# Patient Record
Sex: MALE | URBAN - METROPOLITAN AREA
[De-identification: names, ages, dates, MRNs, and addresses within clinical notes are randomized per-mention and may not be internally consistent; named-entity substitution may affect disease eponyms.]

---

## 2019-12-05 ENCOUNTER — APPOINTMENT (RX ONLY)
Dept: URBAN - METROPOLITAN AREA CLINIC 94 | Facility: CLINIC | Age: 44
Setting detail: DERMATOLOGY
End: 2019-12-05

## 2019-12-05 DIAGNOSIS — L91.0 HYPERTROPHIC SCAR: ICD-10-CM

## 2019-12-05 DIAGNOSIS — L72.0 EPIDERMAL CYST: ICD-10-CM

## 2019-12-05 PROCEDURE — ? COUNSELING

## 2019-12-05 PROCEDURE — ? INCISION AND DRAINAGE

## 2019-12-05 PROCEDURE — 10060 I&D ABSCESS SIMPLE/SINGLE: CPT

## 2019-12-05 PROCEDURE — ? INTRALESIONAL KENALOG

## 2019-12-05 PROCEDURE — ? DEFER

## 2019-12-05 PROCEDURE — 11900 INJECT SKIN LESIONS </W 7: CPT

## 2019-12-05 PROCEDURE — 99202 OFFICE O/P NEW SF 15 MIN: CPT | Mod: 25

## 2019-12-05 PROCEDURE — ? ORDER TESTS

## 2019-12-05 PROCEDURE — ? PRESCRIPTION

## 2019-12-05 PROCEDURE — ? PHOTO-DOCUMENTATION

## 2019-12-05 PROCEDURE — ? OBSERVATION AND MEASURE

## 2019-12-05 ASSESSMENT — LOCATION DETAILED DESCRIPTION DERM
LOCATION DETAILED: RIGHT MEDIAL SUPERIOR CHEST
LOCATION DETAILED: LEFT INFERIOR PREAURICULAR CHEEK
LOCATION DETAILED: RIGHT MEDIAL SUPERIOR CHEST
LOCATION DETAILED: LEFT LATERAL MANDIBULAR CHEEK

## 2019-12-05 ASSESSMENT — LOCATION ZONE DERM
LOCATION ZONE: TRUNK
LOCATION ZONE: FACE
LOCATION ZONE: TRUNK

## 2019-12-05 ASSESSMENT — LOCATION SIMPLE DESCRIPTION DERM
LOCATION SIMPLE: LEFT CHEEK
LOCATION SIMPLE: CHEST
LOCATION SIMPLE: CHEST

## 2019-12-05 NOTE — PROCEDURE: INCISION AND DRAINAGE
Preparation Text: The area was prepped in the usual sterile fashion.
Lesion Type: Cyst
Epidermal Sutures: 4-0 Ethilon
Post-Care Instructions: I reviewed with the patient in detail post-care instructions. \\n\\nPatient should keep wound covered with our bandage for 24 hours. \\n\\nRemove bandage on the day after procedure and begin the following care TWICE DAILY for at least 2 weeks:\\n\\n1) Cleanse site with soap and water.\\n2) Apply bacitracin ointment or aquaphor healing ointment. \\n3) Keep covered during the day with a bandaid to prevent friction and to prevent ointment from staining clothing. Remove bandaid and allow wound to breathe at night.\\n\\n Call the office should any redness, pain, swelling or worsening occur.
Anesthesia Volume In Cc: 3
Render Postcare In Note?: Yes
Detail Level: Detailed
Dressing: dry sterile dressing
Size Of Lesion In Cm (Optional But May Be Required For Some Insurances): 3.7
Anesthesia Type: 1% lidocaine with epinephrine
Curette Text (Optional): After the contents were expressed a curette was used to partially remove the cyst wall.
Suture Text: The incision was partially closed with
Wound Care: Bacitracin
Include Sutures?: No
Epidermal Closure: simple interrupted
Method: 11 blade
Consent was obtained and risks were reviewed including but not limited to delayed wound healing, infection, need for multiple I and D's, and pain.